# Patient Record
Sex: MALE
[De-identification: names, ages, dates, MRNs, and addresses within clinical notes are randomized per-mention and may not be internally consistent; named-entity substitution may affect disease eponyms.]

---

## 2019-06-01 PROBLEM — Z00.00 ENCOUNTER FOR PREVENTIVE HEALTH EXAMINATION: Status: ACTIVE | Noted: 2019-06-01

## 2019-06-17 ENCOUNTER — APPOINTMENT (OUTPATIENT)
Dept: HUMAN REPRODUCTION | Facility: CLINIC | Age: 49
End: 2019-06-17
Payer: SELF-PAY

## 2019-06-17 PROCEDURE — 89322 SEMEN ANAL STRICT CRITERIA: CPT

## 2024-01-19 ENCOUNTER — APPOINTMENT (OUTPATIENT)
Dept: COLORECTAL SURGERY | Facility: CLINIC | Age: 54
End: 2024-01-19
Payer: COMMERCIAL

## 2024-01-19 VITALS
RESPIRATION RATE: 16 BRPM | WEIGHT: 169 LBS | HEIGHT: 66 IN | BODY MASS INDEX: 27.16 KG/M2 | HEART RATE: 78 BPM | SYSTOLIC BLOOD PRESSURE: 159 MMHG | DIASTOLIC BLOOD PRESSURE: 96 MMHG

## 2024-01-19 DIAGNOSIS — K60.3 ANAL FISTULA: ICD-10-CM

## 2024-01-19 DIAGNOSIS — Z78.9 OTHER SPECIFIED HEALTH STATUS: ICD-10-CM

## 2024-01-19 PROCEDURE — 99203 OFFICE O/P NEW LOW 30 MIN: CPT | Mod: 25

## 2024-01-19 PROCEDURE — 46600 DIAGNOSTIC ANOSCOPY SPX: CPT

## 2024-01-19 NOTE — ASSESSMENT
[FreeTextEntry1] : 53-year-old male who presents on referral for evaluation of intermittent perianal swelling. Findings on anoscope: External fistula opening- Left posterior. No internal fistula openings. We discussed the findings and operative intervention. The procedure, risks and benefits were discussed. The patient understood our discussion and will proceed with surgery.  The risks and benefits of fistulotomy was discussed including, but not limited to, bleeding, infection, recurrence, fecal incontinence, urinary retention and injury to surrounding organs. The patient understands the risks and wishes to schedule for exam under anesthesia, seton placement and/or fistulotomy at the earliest convenience.

## 2024-01-19 NOTE — PROCEDURE
[FreeTextEntry1] : Anoscopy  I discussed the reason for the procedure, and the risks and benefits with the patient. Risks including bleeding and discomfort were discussed. The patient understood or discussion and would like to proceed with the procedure.  After completing a digital rectal exam a well lubricated anoscope was gently inserted into the anus. Complete inspection was performed. No tenderness on insertion of the anoscope, and the procedure was tolerated well. No fissures, enlarged hemorrhoids or masses were identified.  Findings on anoscopy: External fistula opeing- Left posterior. No internal fistula openings

## 2024-01-19 NOTE — PHYSICAL EXAM
[Excoriation] : no perianal excoriation [Multiple Sinus Tracts] : no perianal sinus tracts [Fistula] : a fistula [Wart] : no warts [Pilonidal Sinus] : no pilonidal sinus [Tender, Swollen] : nontender, non-swollen [Normal] : was normal [None] : there was no rectal abscess [Alert] : alert [Oriented to Person] : oriented to person [Oriented to Place] : oriented to place [Oriented to Time] : oriented to time [Calm] : calm [de-identified] : REYNA: Nontender, no gross blood, no enlarged hemorrhoids [de-identified] : Left posterior external fistula opening. Nontender, no induration, no drainage [de-identified] : NAD [de-identified] : Nonlabored breathing [de-identified] : Normal rate

## 2024-01-19 NOTE — HISTORY OF PRESENT ILLNESS
[FreeTextEntry1] : 53-year-old male who presents on referral for evaluation of intermittent perianal swelling first noticed early last year. He reports an area in the left perianal side increases in size then becomes normal every 2-3 months. It is associated with pain on. No drainage, no bleeding reported. He denies F/C. No other symptoms reported.

## 2024-01-22 PROBLEM — Z78.9 NON-SMOKER: Status: ACTIVE | Noted: 2024-01-22

## 2024-01-25 ENCOUNTER — NON-APPOINTMENT (OUTPATIENT)
Age: 54
End: 2024-01-25

## 2024-10-18 ENCOUNTER — APPOINTMENT (OUTPATIENT)
Dept: COLORECTAL SURGERY | Facility: CLINIC | Age: 54
End: 2024-10-18
Payer: COMMERCIAL

## 2024-10-18 VITALS
HEART RATE: 76 BPM | BODY MASS INDEX: 26.03 KG/M2 | SYSTOLIC BLOOD PRESSURE: 150 MMHG | DIASTOLIC BLOOD PRESSURE: 95 MMHG | WEIGHT: 162 LBS | RESPIRATION RATE: 16 BRPM | HEIGHT: 66 IN

## 2024-10-18 DIAGNOSIS — K62.89 OTHER SPECIFIED DISEASES OF ANUS AND RECTUM: ICD-10-CM

## 2024-10-18 DIAGNOSIS — K60.30 ANAL FISTULA, UNSPECIFIED: ICD-10-CM

## 2024-10-18 PROCEDURE — 99214 OFFICE O/P EST MOD 30 MIN: CPT | Mod: 25

## 2024-10-18 PROCEDURE — 46600 DIAGNOSTIC ANOSCOPY SPX: CPT

## 2024-10-21 ENCOUNTER — NON-APPOINTMENT (OUTPATIENT)
Age: 54
End: 2024-10-21